# Patient Record
Sex: MALE | Race: WHITE | NOT HISPANIC OR LATINO
[De-identification: names, ages, dates, MRNs, and addresses within clinical notes are randomized per-mention and may not be internally consistent; named-entity substitution may affect disease eponyms.]

---

## 2017-02-07 ENCOUNTER — APPOINTMENT (OUTPATIENT)
Dept: UROLOGY | Facility: CLINIC | Age: 62
End: 2017-02-07

## 2017-02-07 VITALS — RESPIRATION RATE: 16 BRPM | SYSTOLIC BLOOD PRESSURE: 144 MMHG | HEART RATE: 67 BPM | DIASTOLIC BLOOD PRESSURE: 79 MMHG

## 2017-02-08 ENCOUNTER — RECORD ABSTRACTING (OUTPATIENT)
Age: 62
End: 2017-02-08

## 2017-02-08 RX ORDER — ATORVASTATIN CALCIUM 80 MG/1
TABLET, FILM COATED ORAL
Refills: 0 | Status: ACTIVE | COMMUNITY

## 2017-02-08 RX ORDER — CLOPIDOGREL BISULFATE 300 MG/1
TABLET, FILM COATED ORAL
Refills: 0 | Status: ACTIVE | COMMUNITY

## 2017-02-12 LAB
ALBUMIN SERPL ELPH-MCNC: 4.4 G/DL
ALP BLD-CCNC: 99 U/L
ALT SERPL-CCNC: 16 U/L
ANION GAP SERPL CALC-SCNC: 16 MMOL/L
APPEARANCE: CLEAR
AST SERPL-CCNC: 19 U/L
BACTERIA UR CULT: NORMAL
BACTERIA: NEGATIVE
BASOPHILS # BLD AUTO: 0.05 K/UL
BASOPHILS NFR BLD AUTO: 0.6 %
BILIRUB SERPL-MCNC: 1 MG/DL
BILIRUBIN URINE: NEGATIVE
BLOOD URINE: NEGATIVE
BUN SERPL-MCNC: 14 MG/DL
CALCIUM SERPL-MCNC: 9 MG/DL
CHLORIDE SERPL-SCNC: 100 MMOL/L
CO2 SERPL-SCNC: 25 MMOL/L
COLOR: YELLOW
CORE LAB FLUID CYTOLOGY: NORMAL
CREAT SERPL-MCNC: 0.78 MG/DL
EOSINOPHIL # BLD AUTO: 0.21 K/UL
EOSINOPHIL NFR BLD AUTO: 2.7 %
GLUCOSE QUALITATIVE U: NORMAL MG/DL
GLUCOSE SERPL-MCNC: 105 MG/DL
HCT VFR BLD CALC: 45.9 %
HGB BLD-MCNC: 15.4 G/DL
HYALINE CASTS: 0 /LPF
IMM GRANULOCYTES NFR BLD AUTO: 0.5 %
KETONES URINE: NEGATIVE
LEUKOCYTE ESTERASE URINE: NEGATIVE
LYMPHOCYTES # BLD AUTO: 2.17 K/UL
LYMPHOCYTES NFR BLD AUTO: 27.5 %
MAN DIFF?: NORMAL
MCHC RBC-ENTMCNC: 28.8 PG
MCHC RBC-ENTMCNC: 33.6 GM/DL
MCV RBC AUTO: 86 FL
MICROSCOPIC-UA: NORMAL
MONOCYTES # BLD AUTO: 0.96 K/UL
MONOCYTES NFR BLD AUTO: 12.2 %
NEUTROPHILS # BLD AUTO: 4.47 K/UL
NEUTROPHILS NFR BLD AUTO: 56.5 %
NITRITE URINE: NEGATIVE
PH URINE: 5
PLATELET # BLD AUTO: 190 K/UL
POTASSIUM SERPL-SCNC: 4.9 MMOL/L
PROT SERPL-MCNC: 6.9 G/DL
PROTEIN URINE: NEGATIVE MG/DL
PSA SERPL-MCNC: 2.47 NG/ML
RBC # BLD: 5.34 M/UL
RBC # FLD: 14.2 %
RED BLOOD CELLS URINE: 1 /HPF
SODIUM SERPL-SCNC: 141 MMOL/L
SPECIFIC GRAVITY URINE: 1.02
SQUAMOUS EPITHELIAL CELLS: 0 /HPF
TESTOST SERPL-MCNC: 256.7 NG/DL
UROBILINOGEN URINE: NORMAL MG/DL
WBC # FLD AUTO: 7.9 K/UL
WHITE BLOOD CELLS URINE: 0 /HPF

## 2017-02-21 ENCOUNTER — OUTPATIENT (OUTPATIENT)
Dept: OUTPATIENT SERVICES | Facility: HOSPITAL | Age: 62
LOS: 1 days | End: 2017-02-21
Payer: COMMERCIAL

## 2017-02-21 ENCOUNTER — FORM ENCOUNTER (OUTPATIENT)
Age: 62
End: 2017-02-21

## 2017-02-21 ENCOUNTER — APPOINTMENT (OUTPATIENT)
Dept: UROLOGY | Facility: CLINIC | Age: 62
End: 2017-02-21

## 2017-02-21 ENCOUNTER — APPOINTMENT (OUTPATIENT)
Dept: ULTRASOUND IMAGING | Facility: IMAGING CENTER | Age: 62
End: 2017-02-21

## 2017-02-21 DIAGNOSIS — D29.32 BENIGN NEOPLASM OF LEFT EPIDIDYMIS: ICD-10-CM

## 2017-02-21 DIAGNOSIS — Z98.89 OTHER SPECIFIED POSTPROCEDURAL STATES: Chronic | ICD-10-CM

## 2017-02-21 PROCEDURE — 76870 US EXAM SCROTUM: CPT

## 2017-03-06 ENCOUNTER — APPOINTMENT (OUTPATIENT)
Dept: UROLOGY | Facility: CLINIC | Age: 62
End: 2017-03-06

## 2017-03-06 DIAGNOSIS — R10.2 PELVIC AND PERINEAL PAIN: ICD-10-CM

## 2017-03-06 DIAGNOSIS — I86.1 SCROTAL VARICES: ICD-10-CM

## 2017-03-06 DIAGNOSIS — N45.1 EPIDIDYMITIS: ICD-10-CM

## 2017-03-06 DIAGNOSIS — D29.32: ICD-10-CM

## 2017-03-10 LAB — BACTERIA UR CULT: NORMAL

## 2017-03-14 LAB
APPEARANCE: CLEAR
BACTERIA: NEGATIVE
BILIRUBIN URINE: NEGATIVE
BLOOD URINE: NEGATIVE
COLOR: YELLOW
CORE LAB FLUID CYTOLOGY: NORMAL
GLUCOSE QUALITATIVE U: NORMAL MG/DL
KETONES URINE: NEGATIVE
LEUKOCYTE ESTERASE URINE: NEGATIVE
MICROSCOPIC-UA: NORMAL
NITRITE URINE: NEGATIVE
PH URINE: 5
PROTEIN URINE: NEGATIVE MG/DL
RED BLOOD CELLS URINE: 5 /HPF
SPECIFIC GRAVITY URINE: 1.02
SQUAMOUS EPITHELIAL CELLS: 0 /HPF
UROBILINOGEN URINE: NORMAL MG/DL
WHITE BLOOD CELLS URINE: 1 /HPF

## 2017-03-15 ENCOUNTER — MESSAGE (OUTPATIENT)
Age: 62
End: 2017-03-15

## 2017-03-18 LAB
APPEARANCE: CLEAR
BACTERIA: NEGATIVE
BILIRUBIN URINE: NEGATIVE
BLOOD URINE: NEGATIVE
COLOR: YELLOW
GLUCOSE QUALITATIVE U: NORMAL MG/DL
HYALINE CASTS: 0 /LPF
KETONES URINE: ABNORMAL
LEUKOCYTE ESTERASE URINE: NEGATIVE
MICROSCOPIC-UA: NORMAL
NITRITE URINE: NEGATIVE
PH URINE: 5
PROTEIN URINE: NEGATIVE MG/DL
RED BLOOD CELLS URINE: 4 /HPF
SPECIFIC GRAVITY URINE: 1.02
SQUAMOUS EPITHELIAL CELLS: 0 /HPF
UROBILINOGEN URINE: NORMAL MG/DL
WHITE BLOOD CELLS URINE: 1 /HPF

## 2017-03-22 LAB — CORE LAB FLUID CYTOLOGY: NORMAL

## 2018-07-17 ENCOUNTER — EMERGENCY (EMERGENCY)
Facility: HOSPITAL | Age: 63
LOS: 1 days | Discharge: HOME | End: 2018-07-17
Attending: EMERGENCY MEDICINE | Admitting: EMERGENCY MEDICINE
Payer: MEDICARE

## 2018-07-17 VITALS
SYSTOLIC BLOOD PRESSURE: 116 MMHG | RESPIRATION RATE: 16 BRPM | OXYGEN SATURATION: 95 % | DIASTOLIC BLOOD PRESSURE: 68 MMHG | HEART RATE: 67 BPM

## 2018-07-17 VITALS
DIASTOLIC BLOOD PRESSURE: 70 MMHG | TEMPERATURE: 98 F | WEIGHT: 147.93 LBS | RESPIRATION RATE: 19 BRPM | OXYGEN SATURATION: 99 % | HEART RATE: 72 BPM | HEIGHT: 67 IN | SYSTOLIC BLOOD PRESSURE: 136 MMHG

## 2018-07-17 DIAGNOSIS — Z98.89 OTHER SPECIFIED POSTPROCEDURAL STATES: Chronic | ICD-10-CM

## 2018-07-17 LAB
ALBUMIN SERPL ELPH-MCNC: 5 G/DL — SIGNIFICANT CHANGE UP (ref 3.3–5)
ALP SERPL-CCNC: 95 U/L — SIGNIFICANT CHANGE UP (ref 40–120)
ALT FLD-CCNC: 24 U/L — SIGNIFICANT CHANGE UP (ref 10–45)
ANION GAP SERPL CALC-SCNC: 19 MMOL/L — HIGH (ref 5–17)
APPEARANCE UR: CLEAR — SIGNIFICANT CHANGE UP
APTT BLD: 28.4 SEC — SIGNIFICANT CHANGE UP (ref 27.5–37.4)
AST SERPL-CCNC: 22 U/L — SIGNIFICANT CHANGE UP (ref 10–40)
BASOPHILS # BLD AUTO: 0 K/UL — SIGNIFICANT CHANGE UP (ref 0–0.2)
BASOPHILS NFR BLD AUTO: 0.2 % — SIGNIFICANT CHANGE UP (ref 0–2)
BILIRUB SERPL-MCNC: 1.1 MG/DL — SIGNIFICANT CHANGE UP (ref 0.2–1.2)
BILIRUB UR-MCNC: NEGATIVE — SIGNIFICANT CHANGE UP
BUN SERPL-MCNC: 14 MG/DL — SIGNIFICANT CHANGE UP (ref 7–23)
CALCIUM SERPL-MCNC: 9.2 MG/DL — SIGNIFICANT CHANGE UP (ref 8.4–10.5)
CHLORIDE SERPL-SCNC: 104 MMOL/L — SIGNIFICANT CHANGE UP (ref 96–108)
CO2 SERPL-SCNC: 23 MMOL/L — SIGNIFICANT CHANGE UP (ref 22–31)
COLOR SPEC: SIGNIFICANT CHANGE UP
COMMENT - URINE: SIGNIFICANT CHANGE UP
CREAT SERPL-MCNC: 0.83 MG/DL — SIGNIFICANT CHANGE UP (ref 0.5–1.3)
DIFF PNL FLD: NEGATIVE — SIGNIFICANT CHANGE UP
EOSINOPHIL # BLD AUTO: 0 K/UL — SIGNIFICANT CHANGE UP (ref 0–0.5)
EOSINOPHIL NFR BLD AUTO: 0.3 % — SIGNIFICANT CHANGE UP (ref 0–6)
GAS PNL BLDV: SIGNIFICANT CHANGE UP
GLUCOSE SERPL-MCNC: 143 MG/DL — HIGH (ref 70–99)
GLUCOSE UR QL: NEGATIVE — SIGNIFICANT CHANGE UP
HCT VFR BLD CALC: 45.7 % — SIGNIFICANT CHANGE UP (ref 39–50)
HGB BLD-MCNC: 16.5 G/DL — SIGNIFICANT CHANGE UP (ref 13–17)
INR BLD: 1.04 RATIO — SIGNIFICANT CHANGE UP (ref 0.88–1.16)
KETONES UR-MCNC: ABNORMAL
LACTATE BLDV-MCNC: 3.1 MMOL/L — HIGH (ref 0.7–2)
LACTATE BLDV-MCNC: 3.9 MMOL/L — HIGH (ref 0.7–2)
LEUKOCYTE ESTERASE UR-ACNC: NEGATIVE — SIGNIFICANT CHANGE UP
LIDOCAIN IGE QN: 23 U/L — SIGNIFICANT CHANGE UP (ref 7–60)
LYMPHOCYTES # BLD AUTO: 1.3 K/UL — SIGNIFICANT CHANGE UP (ref 1–3.3)
LYMPHOCYTES # BLD AUTO: 12.4 % — LOW (ref 13–44)
MCHC RBC-ENTMCNC: 31.5 PG — SIGNIFICANT CHANGE UP (ref 27–34)
MCHC RBC-ENTMCNC: 36.1 GM/DL — HIGH (ref 32–36)
MCV RBC AUTO: 87.5 FL — SIGNIFICANT CHANGE UP (ref 80–100)
MONOCYTES # BLD AUTO: 0.6 K/UL — SIGNIFICANT CHANGE UP (ref 0–0.9)
MONOCYTES NFR BLD AUTO: 5.6 % — SIGNIFICANT CHANGE UP (ref 2–14)
NEUTROPHILS # BLD AUTO: 8.3 K/UL — HIGH (ref 1.8–7.4)
NEUTROPHILS NFR BLD AUTO: 81.6 % — HIGH (ref 43–77)
NITRITE UR-MCNC: NEGATIVE — SIGNIFICANT CHANGE UP
PH UR: 7.5 — SIGNIFICANT CHANGE UP (ref 5–8)
PLATELET # BLD AUTO: 183 K/UL — SIGNIFICANT CHANGE UP (ref 150–400)
POTASSIUM SERPL-MCNC: 4.5 MMOL/L — SIGNIFICANT CHANGE UP (ref 3.5–5.3)
POTASSIUM SERPL-SCNC: 4.5 MMOL/L — SIGNIFICANT CHANGE UP (ref 3.5–5.3)
PROT SERPL-MCNC: 7.6 G/DL — SIGNIFICANT CHANGE UP (ref 6–8.3)
PROT UR-MCNC: SIGNIFICANT CHANGE UP
PROTHROM AB SERPL-ACNC: 11.2 SEC — SIGNIFICANT CHANGE UP (ref 9.8–12.7)
RBC # BLD: 5.22 M/UL — SIGNIFICANT CHANGE UP (ref 4.2–5.8)
RBC # FLD: 12.8 % — SIGNIFICANT CHANGE UP (ref 10.3–14.5)
RBC CASTS # UR COMP ASSIST: SIGNIFICANT CHANGE UP /HPF (ref 0–2)
SODIUM SERPL-SCNC: 146 MMOL/L — HIGH (ref 135–145)
SP GR SPEC: >1.03 — HIGH (ref 1.01–1.02)
UROBILINOGEN FLD QL: NEGATIVE — SIGNIFICANT CHANGE UP
WBC # BLD: 10.2 K/UL — SIGNIFICANT CHANGE UP (ref 3.8–10.5)
WBC # FLD AUTO: 10.2 K/UL — SIGNIFICANT CHANGE UP (ref 3.8–10.5)
WBC UR QL: SIGNIFICANT CHANGE UP /HPF (ref 0–5)

## 2018-07-17 PROCEDURE — 81001 URINALYSIS AUTO W/SCOPE: CPT

## 2018-07-17 PROCEDURE — 84295 ASSAY OF SERUM SODIUM: CPT

## 2018-07-17 PROCEDURE — 85027 COMPLETE CBC AUTOMATED: CPT

## 2018-07-17 PROCEDURE — 96374 THER/PROPH/DIAG INJ IV PUSH: CPT | Mod: XU

## 2018-07-17 PROCEDURE — 84132 ASSAY OF SERUM POTASSIUM: CPT

## 2018-07-17 PROCEDURE — 85610 PROTHROMBIN TIME: CPT

## 2018-07-17 PROCEDURE — 70450 CT HEAD/BRAIN W/O DYE: CPT

## 2018-07-17 PROCEDURE — 82435 ASSAY OF BLOOD CHLORIDE: CPT

## 2018-07-17 PROCEDURE — 82803 BLOOD GASES ANY COMBINATION: CPT

## 2018-07-17 PROCEDURE — 82947 ASSAY GLUCOSE BLOOD QUANT: CPT

## 2018-07-17 PROCEDURE — 74177 CT ABD & PELVIS W/CONTRAST: CPT

## 2018-07-17 PROCEDURE — 93010 ELECTROCARDIOGRAM REPORT: CPT

## 2018-07-17 PROCEDURE — 70450 CT HEAD/BRAIN W/O DYE: CPT | Mod: 26

## 2018-07-17 PROCEDURE — 85730 THROMBOPLASTIN TIME PARTIAL: CPT

## 2018-07-17 PROCEDURE — 93005 ELECTROCARDIOGRAM TRACING: CPT

## 2018-07-17 PROCEDURE — 80053 COMPREHEN METABOLIC PANEL: CPT

## 2018-07-17 PROCEDURE — 82330 ASSAY OF CALCIUM: CPT

## 2018-07-17 PROCEDURE — 83690 ASSAY OF LIPASE: CPT

## 2018-07-17 PROCEDURE — 99284 EMERGENCY DEPT VISIT MOD MDM: CPT | Mod: 25

## 2018-07-17 PROCEDURE — 85014 HEMATOCRIT: CPT

## 2018-07-17 PROCEDURE — 83605 ASSAY OF LACTIC ACID: CPT

## 2018-07-17 PROCEDURE — 74177 CT ABD & PELVIS W/CONTRAST: CPT | Mod: 26

## 2018-07-17 RX ORDER — DIAZEPAM 5 MG
2 TABLET ORAL ONCE
Qty: 0 | Refills: 0 | Status: DISCONTINUED | OUTPATIENT
Start: 2018-07-17 | End: 2018-07-17

## 2018-07-17 RX ORDER — SODIUM CHLORIDE 9 MG/ML
1000 INJECTION INTRAMUSCULAR; INTRAVENOUS; SUBCUTANEOUS ONCE
Qty: 0 | Refills: 0 | Status: COMPLETED | OUTPATIENT
Start: 2018-07-17 | End: 2018-07-17

## 2018-07-17 RX ORDER — MECLIZINE HCL 12.5 MG
2 TABLET ORAL
Qty: 16 | Refills: 0 | OUTPATIENT
Start: 2018-07-17 | End: 2018-07-20

## 2018-07-17 RX ORDER — SODIUM CHLORIDE 9 MG/ML
1000 INJECTION, SOLUTION INTRAVENOUS ONCE
Qty: 0 | Refills: 0 | Status: COMPLETED | OUTPATIENT
Start: 2018-07-17 | End: 2018-07-17

## 2018-07-17 RX ORDER — MECLIZINE HCL 12.5 MG
25 TABLET ORAL ONCE
Qty: 0 | Refills: 0 | Status: COMPLETED | OUTPATIENT
Start: 2018-07-17 | End: 2018-07-17

## 2018-07-17 RX ORDER — DIPHENHYDRAMINE HCL 50 MG
50 CAPSULE ORAL ONCE
Qty: 0 | Refills: 0 | Status: DISCONTINUED | OUTPATIENT
Start: 2018-07-17 | End: 2018-07-17

## 2018-07-17 RX ORDER — MECLIZINE HCL 12.5 MG
50 TABLET ORAL ONCE
Qty: 0 | Refills: 0 | Status: DISCONTINUED | OUTPATIENT
Start: 2018-07-17 | End: 2018-07-17

## 2018-07-17 RX ORDER — ONDANSETRON 8 MG/1
4 TABLET, FILM COATED ORAL ONCE
Qty: 0 | Refills: 0 | Status: COMPLETED | OUTPATIENT
Start: 2018-07-17 | End: 2018-07-17

## 2018-07-17 RX ADMIN — SODIUM CHLORIDE 1000 MILLILITER(S): 9 INJECTION INTRAMUSCULAR; INTRAVENOUS; SUBCUTANEOUS at 09:40

## 2018-07-17 RX ADMIN — ONDANSETRON 4 MILLIGRAM(S): 8 TABLET, FILM COATED ORAL at 09:40

## 2018-07-17 RX ADMIN — Medication 2 MILLIGRAM(S): at 12:34

## 2018-07-17 RX ADMIN — SODIUM CHLORIDE 1000 MILLILITER(S): 9 INJECTION, SOLUTION INTRAVENOUS at 12:39

## 2018-07-17 RX ADMIN — SODIUM CHLORIDE 2000 MILLILITER(S): 9 INJECTION, SOLUTION INTRAVENOUS at 11:47

## 2018-07-17 RX ADMIN — Medication 25 MILLIGRAM(S): at 12:34

## 2018-07-17 RX ADMIN — Medication 25 MILLIGRAM(S): at 09:39

## 2018-07-17 NOTE — ED ADULT NURSE NOTE - OBJECTIVE STATEMENT
Patient presents to Ed with c/o abd pain. Per patients spouse patient has been experiencing room spinning  dizziness x 1 week and has vomited 5 times since last night. Patient A&Ox3,hx of stroke per spouse, speaks Upper sorbian,   denies chest pain or sob, +nausea and vomiting no diarrhea fever or chills. Patient reports dizziness when he stands   up to amb no headache, reports llq pain when vomiting. LAC 20g iv lock placed, labs drawn.

## 2018-07-17 NOTE — ED SUB INTERN NOTE - OBJECTIVE STATEMENT FT
MA is a 61 y/o M with a PMHx of CVA (2015) with moderate residual aphasia (history obtained from wife), complaining of acute dizziness x1 week. Dizziness was intermittent in nature, progressively worsening, worsens with movement, and positional in nature. Last night, the dizziness was constant, and he endorsed 5+ episodes of NBNB vomiting with abdominal pain prompting him to come into the ED for further evaluation.    He otherwise denies fevers, shortness of breath, cough, chest pain, or diarrhea.

## 2018-07-17 NOTE — ED PROVIDER NOTE - PSH
H/O arthroscopy of shoulder  right 4/2014  H/O bilateral inguinal hernia repair  4/2004  History of appendectomy  1965  History of tonsillectomy  1963  S/P excision of lipoma  multiple (chest, back:[id number]

## 2018-07-17 NOTE — ED PROVIDER NOTE - CARE PLAN
Principal Discharge DX:	Vertigo Principal Discharge DX:	Vertigo  Assessment and plan of treatment:	1) Follow up with your doctor in 1 week. Call for an appointment.   2) Return to the ER immediately for new or worsening symptoms including chest pain, severe dizziness or other issues.   3) Take Meclizine 50mg twice a day as needed for dizziness.   4) Drink plenty of fluids.

## 2018-07-17 NOTE — ED ADULT TRIAGE NOTE - CHIEF COMPLAINT QUOTE
abdominal pain, vomiting, dizziness since last night. Pt is non verbal due to past stroke- as per wife, pt communicates through gestures.

## 2018-07-17 NOTE — ED SUB INTERN NOTE - PHYSICAL EXAMINATION
Vital Signs:  Vital Signs Last 24 Hrs  T(C): 36.4 (17 Jul 2018 08:25), Max: 36.4 (17 Jul 2018 08:25)  T(F): 97.5 (17 Jul 2018 08:25), Max: 97.5 (17 Jul 2018 08:25)  HR: 72 (17 Jul 2018 08:25) (72 - 72)  BP: 136/70 (17 Jul 2018 08:25) (136/70 - 136/70)  RR: 19 (17 Jul 2018 08:25) (19 - 19)  SpO2: 99% (17 Jul 2018 08:25) (99% - 99%) Vital Signs:  Vital Signs Last 24 Hrs  T(C): 36.4 (17 Jul 2018 08:25), Max: 36.4 (17 Jul 2018 08:25)  T(F): 97.5 (17 Jul 2018 08:25), Max: 97.5 (17 Jul 2018 08:25)  HR: 72 (17 Jul 2018 08:25) (72 - 72)  BP: 136/70 (17 Jul 2018 08:25) (136/70 - 136/70)  RR: 19 (17 Jul 2018 08:25) (19 - 19)  SpO2: 99% (17 Jul 2018 08:25) (99% - 99%)    General: Male laying in bed in mild discomfort, hesitant to move due to dizziness  HEENT: NCAT, PERRLA, no conjunctival pallor, moist mucous membranes, no oropharyngeal exudates or erythema  Neck: Supple, no lymphadenopathy  Pulmonary: Clear to auscultation bilaterally, no wheezing, rales, or ronchi  Cardiovascular: Regular rate and rhythm, normal S1/S2, no murmurs, rubs, or gallops  Abdominal Exam: Soft, tender to palpation in LLQ and RLQ, no organomegaly  Extremities: No edema, pulses 2+ bilaterally in the upper and lower extremities, capillary refill < 2 sec.  Neuro: Horizontal nystagmus to the left. No vertical or rotary nystagmus. CNs, motor, sensory, coordination, and reflexes grossly intact in all extremities  Skin: Warm, dry, no visible jaundice, no rashes

## 2018-07-17 NOTE — ED PROVIDER NOTE - ATTENDING CONTRIBUTION TO CARE
68y/o M with h/o CVA (L MCA, residual aphasia), presenting with intermittent room-spinning sensation, worse with changes in position and looking left.  No significant HA. +nausea/vomiting.  Abdominal pain, lower, cramping, and a/w vomiting.  No new weakness/numbness or difficulty walking.  No fever/chills. No diarrhea.  Last BM yesterday, normal per wife.    On exam, VSS, afebrile, appears uncomfortable but cooperative with exam; says simple words/yes/no, and appears to answer questions correctly (baseline as per wife at bedside). Nrl cardiac s1s2 rrr no mgr.  Lungs CTABL.  Abdomen with mild left lower abdominal tenderness on my exam, without significant distension, rebound or guarding.  Nrl bs in all 4 quadrants.  No rash.  ENT: mildly dry mucosa, but no lesions.  No cervical LNA.  Neuro: aphasic (baseline). No significant cranial nerve deficits elicited; however, leftward extinguishable nystagmus elicited on EOMI, and Nutley's Hallpike +; HINTS c/w peripheral vertigo.      Given patient's medical history CT head obtained to eval for possible bleeding, no acute changes noted.  CT A/P obtained given lower abdominal tenderness, no acute pathology identified.  Labs obtained, notable for elevated lactate 5; which downtrended with iv fluid/hydration.  Patient's PE is c/w peripheral vertigo, and symptoms improved significantly with meclizine while in the ED, began tolerating oral liquids and appeared much more comfortable.   Likely abdominal pain and elevated lactate related to vomiting and dehydration.  Dizziness/room-spinning likely peripheral vertigo; will be stable for dc and recommend continue evaluation/management as outpatient.  Will dc with Rx for meclizine.

## 2018-07-17 NOTE — ED SUB INTERN NOTE - PROGRESS NOTE DETAILS
Reassessed at bedside, continues to endorse same dizziness, nausea, and abdominal pain without relief. CBC reviewed, not concerning at this time. Coags, CMP not of concern. vBG revealed lactate of 5.7, likely due to hypovolemia secondary to vomiting. CTH and CTAP negative. Will continue IVF hydration (2L total) and repeat. Reassessed, continues to endorse dizziness/nausea. Exam unchanged. Will treat with additional IV meclizine 25mg and valium 2mg. UA pending. Will reassess.

## 2018-07-17 NOTE — ED PROVIDER NOTE - MEDICAL DECISION MAKING DETAILS
62yoM pw vertigo and abd pain, likely benign, however given history and Tenderness on abd, will send labs, ct head and abd, meds, fluids and reass.

## 2018-07-17 NOTE — ED PROVIDER NOTE - PROGRESS NOTE DETAILS
Jacobo DAVID - patient nausea improved. lacate elevated. will give 2 L total fluid and repeat. likely in the setting of vomiting. no other signs of sepsis. Jacobo DAVID - PT seen and reassessed.  Patient symptomatically improved.   AAOX3, NAD, VSS.  Discussed test results w/ patient, given copy of results. Patient verbalized understanding of hospital course and outpatient plans, has decisional making capacity.  Will follow-up with Primary care doctor in the next 5-7 days; patient will call for an appointment. Will return to the ED if there is any worsening of symptoms.  Patient able to ambulate w/o difficulty, is tolerating PO intake

## 2018-07-17 NOTE — ED SUB INTERN NOTE - PSH
H/O arthroscopy of shoulder  right 4/2014  H/O bilateral inguinal hernia repair  4/2004  History of appendectomy  1965  History of tonsillectomy  1963  S/P excision of lipoma  multiple (chest, back:[id number]

## 2018-07-17 NOTE — ED PROVIDER NOTE - PLAN OF CARE
1) Follow up with your doctor in 1 week. Call for an appointment.   2) Return to the ER immediately for new or worsening symptoms including chest pain, severe dizziness or other issues.   3) Take Meclizine 50mg twice a day as needed for dizziness.   4) Drink plenty of fluids.

## 2020-06-16 ENCOUNTER — LABORATORY RESULT (OUTPATIENT)
Age: 65
End: 2020-06-16

## 2020-06-16 ENCOUNTER — APPOINTMENT (OUTPATIENT)
Dept: UROLOGY | Facility: CLINIC | Age: 65
End: 2020-06-16
Payer: MEDICARE

## 2020-06-16 VITALS — TEMPERATURE: 97.5 F

## 2020-06-16 VITALS — DIASTOLIC BLOOD PRESSURE: 66 MMHG | SYSTOLIC BLOOD PRESSURE: 133 MMHG | HEART RATE: 69 BPM

## 2020-06-16 DIAGNOSIS — L72.9 FOLLICULAR CYST OF THE SKIN AND SUBCUTANEOUS TISSUE, UNSPECIFIED: ICD-10-CM

## 2020-06-16 PROCEDURE — 99214 OFFICE O/P EST MOD 30 MIN: CPT

## 2020-06-16 RX ORDER — LEVOFLOXACIN 500 MG/1
500 TABLET, FILM COATED ORAL DAILY
Qty: 14 | Refills: 0 | Status: COMPLETED | COMMUNITY
Start: 2017-02-07 | End: 2020-06-16

## 2020-06-16 NOTE — PHYSICAL EXAM
[General Appearance - Well Developed] : well developed [General Appearance - Well Nourished] : well nourished [Normal Appearance] : normal appearance [Well Groomed] : well groomed [Abdomen Tenderness] : non-tender [General Appearance - In No Acute Distress] : no acute distress [Abdomen Soft] : soft [Costovertebral Angle Tenderness] : no ~M costovertebral angle tenderness [Skin Color & Pigmentation] : normal skin color and pigmentation [Heart Rate And Rhythm] : Heart rate and rhythm were normal [] : no respiratory distress [Exaggerated Use Of Accessory Muscles For Inspiration] : no accessory muscle use [Mood] : the mood was normal [Oriented To Time, Place, And Person] : oriented to person, place, and time [Affect] : the affect was normal [Not Anxious] : not anxious [Normal Station and Gait] : the gait and station were normal for the patient's age [Cervical Lymph Nodes Enlarged Posterior Bilaterally] : posterior cervical [No Focal Deficits] : no focal deficits [Supraclavicular Lymph Nodes Enlarged Bilaterally] : supraclavicular [Cervical Lymph Nodes Enlarged Anterior Bilaterally] : anterior cervical [Urethral Meatus] : meatus normal [Penis Abnormality] : normal circumcised penis [Urinary Bladder Findings] : the bladder was normal on palpation [Testes Mass (___cm)] : there were no testicular masses [Scrotum] : the scrotum was normal [No Prostate Nodules] : no prostate nodules [Prostate Size ___ gm] : prostate size [unfilled] gm [FreeTextEntry1] : No spine tenderness.

## 2020-06-16 NOTE — ADDENDUM
[FreeTextEntry1] : This note was authored by Alanna Burrell working as scribe for Dr. Live Zamora. I, Dr. Live Zamora, have reviewed the content of this note and confirm it is true and accurate. I personally performed the history and physical examination and made all the decisions.\par 06/16/2020

## 2020-06-16 NOTE — ASSESSMENT
[FreeTextEntry1] : Scrotal US 02/22/17 found a 4 mm right epididymal cyst and a varicocele on left side. The patient feels better since completing his course of levofloxacin. However, he is still bothered by the cysts on his scrotum. I poked a small hole in one of the sebaceous cysts and expressed the contents completely. I explained the patient that if the remaining cysts continue to bother him, he can return to have the rest of the cysts expressed. This cyst was more difficult than expected due to the cyst being more dry than expected. At that time I explained that I would numb the patient with local anesthetic and cut the cysts open removing the contents. I again reiterated that the cysts are benign and only require action if they continue to bother him. If everything remains normal the patient will return in 3 months for follow up. The patient produced a urine sample which will be sent for urinalysis, urine cytology, and urine culture. This note was authored by Gabe Bustos working as scribe for Dr. Live Zamora.\par I, Dr. Live Zamora, have reviewed the content of this note and confirm it is true and accurate. I personally performed the history and physical examination and made all the decisions. 03/06/17 5:00 PM\par \par 06/16/2020: Patient presents today for a follow up. Pt had 2015 Prostate Bx with benign results. PSA was 2.47 on 02/07/17 and testosterone was 256.7. Pt is voiding well. No ED. He complains of some pain on his testicle from a cyst. Pt is on Atorvastatin and Plavix. No Hx of smoking. \par \par Digital rectal exam found pale patch of skin below surface 3mm across. another path 4mm across that's deeper. Non tender, non inflamed. Not circumcised. No anal adenopathy. Firmness in left base. 7 TUR.\par \par Pt will provide a urine sample today for culture, cytology and analysis. Bloodwork today will include ALK SUSAN, BMP, PSA and testosterone. \par \par Scheduled pt for an MRI of the prostate. RTO 1 week afterwards.

## 2020-06-16 NOTE — HISTORY OF PRESENT ILLNESS
[FreeTextEntry1] : Mr Brenner is a 61 year old man presenting for follow up of microscopic hematuria, varicocele, perineal pain, and epididymitis. The patient had a left inguinal hernia repair in the past. Since that time he has had left inguinal and left testicular spermatic cord pain. Scrotal US 09/02/15 visualized a 3.3 mm right epididymal cyst. Multiple small left varicoceles, largest measured 2.5 mm. Both testicles are normal in size and echogenicity. Normal vascularity in testicles and epididymides. No intratesticular masses present bilaterally. Normal Resistive Index bilaterally. We discussed surgical intervention about a year ago, but the patient opted for further observation. The patient suffered a stroke this past December. It eliminated most of his ability to speak and the right side of his face is slightly weak. Otherwise motor function is intact. On physical exam 02/07/17 the patient had a few small sebaceous cysts of scrotum bilaterally. Mild left epididymal tenderness. There was no intra testicular masses bilaterally. Testicular positions normal bilaterally. Normal spermatic cords. I started the patient on a two week course of Levofloxacin 500 mg once daily. PSA taken 02/07/17 was 2.47. On physical exam 02/21/17 there were 5 sebaceous cysts remaining on the scrotum. Testicles bilaterally descended. Epididymal cyst on left. Left scrotum tender. No inguinal or femoral hernia. Scrotal US 02/22/17 found a 4 mm right epididymal cyst and a varicocele on left side. The patient feels better since completing his course of levofloxacin. However, he is still bothered by the cysts on his scrotum. \par \par 06/16/2020: Patient presents today for a follow up. Pt had 2015 Prostate Bx with benign results. PSA was 2.47 on 02/07/17 and testosterone was 256.7. Pt is voiding well. No ED. He complains of some pain on his testicle from a cyst. Pt is on Atorvastatin and Plavix. No Hx of smoking.

## 2020-06-16 NOTE — REVIEW OF SYSTEMS
[Eyesight Problems] : eyesight problems [Constipation] : constipation [Lower Back Pain] : lower back pain [Feeling Poorly] : not feeling poorly [Cough] : no cough [Chest Pain] : no chest pain

## 2020-06-21 LAB
ALP BLD-CCNC: 100 U/L
ANION GAP SERPL CALC-SCNC: 12 MMOL/L
APPEARANCE: ABNORMAL
BILIRUBIN URINE: NEGATIVE
BLOOD URINE: NEGATIVE
BUN SERPL-MCNC: 16 MG/DL
CALCIUM SERPL-MCNC: 9.2 MG/DL
CHLORIDE SERPL-SCNC: 104 MMOL/L
CO2 SERPL-SCNC: 26 MMOL/L
COLOR: YELLOW
CREAT SERPL-MCNC: 0.88 MG/DL
GLUCOSE QUALITATIVE U: NEGATIVE
GLUCOSE SERPL-MCNC: 115 MG/DL
KETONES URINE: NEGATIVE
LEUKOCYTE ESTERASE URINE: NEGATIVE
NITRITE URINE: NEGATIVE
PH URINE: 5.5
POTASSIUM SERPL-SCNC: 4.1 MMOL/L
PROTEIN URINE: NORMAL
PSA SERPL-MCNC: 2.81 NG/ML
SODIUM SERPL-SCNC: 142 MMOL/L
SPECIFIC GRAVITY URINE: 1.03
TESTOST SERPL-MCNC: 377 NG/DL
URINE CYTOLOGY: NORMAL
UROBILINOGEN URINE: NORMAL

## 2020-07-25 ENCOUNTER — APPOINTMENT (OUTPATIENT)
Dept: MRI IMAGING | Facility: IMAGING CENTER | Age: 65
End: 2020-07-25

## 2021-04-02 NOTE — ED SUB INTERN NOTE - MEDICAL DECISION MAKING DETAILS
[Follow-up Visit ___] : a follow-up visit  for [unfilled] MA is a 61 y/o PMHx of CVA complaining of acute dizziness x1 week, positional in nature. Exam: Horizontal nystagmus and abdominal tenderness. Assessment: Likely secondary to BPPV and abdominal pain secondary to multiple episodes of emesis. Cannot rule out intra-abdominal pathology or recurrent CVA. Plan for IVF NS 1L bolus, meclizine, zofran, CTH, CTAP. Will reassess and continue to monitor clinically.

## 2022-03-28 ENCOUNTER — APPOINTMENT (OUTPATIENT)
Dept: UROLOGY | Facility: CLINIC | Age: 67
End: 2022-03-28
Payer: MEDICARE

## 2022-03-28 DIAGNOSIS — N40.1 BENIGN PROSTATIC HYPERPLASIA WITH LOWER URINARY TRACT SYMPMS: ICD-10-CM

## 2022-03-28 DIAGNOSIS — L29.1 PRURITUS SCROTI: ICD-10-CM

## 2022-03-28 DIAGNOSIS — R31.29 OTHER MICROSCOPIC HEMATURIA: ICD-10-CM

## 2022-03-28 DIAGNOSIS — N40.2 NODULAR PROSTATE W/OUT LOWER URINARY TRACT SYMPTOMS: ICD-10-CM

## 2022-03-28 DIAGNOSIS — R93.5 ABNORMAL FINDINGS ON DIAGNOSTIC IMAGING OF OTHER ABDOMINAL REGIONS, INCLUDING RETROPERITONEUM: ICD-10-CM

## 2022-03-28 DIAGNOSIS — N13.8 BENIGN PROSTATIC HYPERPLASIA WITH LOWER URINARY TRACT SYMPMS: ICD-10-CM

## 2022-03-28 PROCEDURE — 99215 OFFICE O/P EST HI 40 MIN: CPT

## 2022-03-28 RX ORDER — CLOTRIMAZOLE AND BETAMETHASONE DIPROPIONATE 10; .5 MG/G; MG/G
1-0.05 CREAM TOPICAL
Qty: 1 | Refills: 1 | Status: ACTIVE | COMMUNITY
Start: 2022-03-28 | End: 1900-01-01

## 2022-03-28 NOTE — ASSESSMENT
[FreeTextEntry1] : Scrotal US 02/22/17 found a 4 mm right epididymal cyst and a varicocele on left side. The patient feels better since completing his course of levofloxacin. However, he is still bothered by the cysts on his scrotum. I poked a small hole in one of the sebaceous cysts and expressed the contents completely. I explained the patient that if the remaining cysts continue to bother him, he can return to have the rest of the cysts expressed. This cyst was more difficult than expected due to the cyst being more dry than expected. At that time I explained that I would numb the patient with local anesthetic and cut the cysts open removing the contents. I again reiterated that the cysts are benign and only require action if they continue to bother him. If everything remains normal the patient will return in 3 months for follow up. The patient produced a urine sample which will be sent for urinalysis, urine cytology, and urine culture. This note was authored by Gabe Bustos working as scribe for Dr. Live Zamora.\par I, Dr. Live Zamora, have reviewed the content of this note and confirm it is true and accurate. I personally performed the history and physical examination and made all the decisions. 03/06/17 5:00 PM\par \par 06/16/2020: Patient presents today for a follow up. Pt had 2015 Prostate Bx with benign results. PSA was 2.47 on 02/07/17 and testosterone was 256.7. Pt is voiding well. No ED. He complains of some pain on his testicle from a cyst. Pt is on Atorvastatin and Plavix. No Hx of smoking. \par \par 03/28/2022: Patient presents today for follow up. Accompanied by wife who is acting as . Patient provided report of 2/28/22 MRI of bony pelvis w/o contrast for pain in left lower back ordered by Dr. Dozier which showed prostate gland moderately enlarged, nodularity within central gland, right posterior aspect of central gland is hypointense, and presents today to have abnormality of prostate evaluated. Patient's back pain is position-dependent and worsened with movement, but is now resolved. Erections are good. No hx of kidney stones. No FHx of prostate cancer. Also complains of itching of testicles. Patient urinates about 4-5x during the day. Nocturia x1-2. No urinary urgency, straining, gross hematuria, or dysuria. Not taking atorvastatin currently although wife states his cholesterol is high. \par \par Digital rectal exam found no suspicious rectal masses. No rectal mucosal lesions. Anal tone is normal. The prostate is non tender, with normal texture, discrete borders. Firm area of left base, 1cm, deep, a little bit irregular. It is a X gram transurethral resection size. No gross blood on examining fingers. \par \par Reviewed 2/28/22 MRI of bony pelvis (no contrast) report. \par \par Recommend prostate MRI to evaluate abnormality of  right prostate seen on 2/28/22 MRI of bony pelvis as well as prostate nodule felt on left side of prostate. \par \par Recommend patient to return to  atorvastatin and discuss with PCP. He should discuss this with Dr. Dozier. \par \par We applied a band aid to a small abrasion of his left thumb. \par \par I will prescribe Clotrimazole-Betamethasone cream to apply to affected scrotal  area twice daily, and should continue for two additional days after symptoms resolve. \par \par The patient produced a urine sample which will be sent for urinalysis, urine cytology, and urine culture. \par Blood work today included BMP, alkaline phosphatase, PSA, and testosterone. \par \par RTO 1 week after prostate MRI to review results. \par \par Preparation, in-person office visit, and coordination of care took: 45 minutes

## 2022-03-28 NOTE — ADDENDUM
[FreeTextEntry1] : I, Joanne Ballesterosin, acted solely as a scribe for Dr. Live Zamora on this date 03/28/2022.\par \par All medical record entries made by the Scribe were at my, Dr. Live Zamora, direction and personally dictated by me on 03/28/2022. I have reviewed the chart and agree that the record accurately reflects my personal performance of the history, physical exam, assessment and plan.  I have also personally directed, reviewed and agreed with the chart.

## 2022-03-28 NOTE — HISTORY OF PRESENT ILLNESS
[FreeTextEntry1] : Mr Brenner is a 66 year old man presenting for follow up of microscopic hematuria, varicocele, perineal pain, and epididymitis. The patient had a left inguinal hernia repair in the past. Since that time he has had left inguinal and left testicular spermatic cord pain. Scrotal US 09/02/15 visualized a 3.3 mm right epididymal cyst. Multiple small left varicoceles, largest measured 2.5 mm. Both testicles are normal in size and echogenicity. Normal vascularity in testicles and epididymides. No intratesticular masses present bilaterally. Normal Resistive Index bilaterally. We discussed surgical intervention about a year ago, but the patient opted for further observation. The patient suffered a stroke this past December. It eliminated most of his ability to speak and the right side of his face is slightly weak. Otherwise motor function is intact. On physical exam 02/07/17 the patient had a few small sebaceous cysts of scrotum bilaterally. Mild left epididymal tenderness. There was no intra testicular masses bilaterally. Testicular positions normal bilaterally. Normal spermatic cords. I started the patient on a two week course of Levofloxacin 500 mg once daily. PSA taken 02/07/17 was 2.47. On physical exam 02/21/17 there were 5 sebaceous cysts remaining on the scrotum. Testicles bilaterally descended. Epididymal cyst on left. Left scrotum tender. No inguinal or femoral hernia. Scrotal US 02/22/17 found a 4 mm right epididymal cyst and a varicocele on left side. The patient feels better since completing his course of levofloxacin. However, he is still bothered by the cysts on his scrotum. \par \par 06/16/2020: Patient presents today for a follow up. Pt had 2015 Prostate Bx with benign results. PSA was 2.47 on 02/07/17 and testosterone was 256.7. Pt is voiding well. No ED. He complains of some pain on his testicle from a cyst. Pt is on Atorvastatin and Plavix. No Hx of smoking. \par \par 03/28/2022: Patient presents today for follow up. Accompanied by wife who is acting as . Patient provided report of 2/28/22 MRI of bony pelvis w/o contrast for pain in left lower back ordered by Dr. Dozier which showed prostate gland moderately enlarged, nodularity within central gland, right posterior aspect of central gland is hypointense, and presents today to have abnormality of prostate evaluated. Patient's back pain is position-dependent and worsened with movement, but is now resolved. Erections are good. No hx of kidney stones. No FHx of prostate cancer. Also complains of itching of scrotum.  Patient urinates about 4-5x during the day. Nocturia x1-2. No urinary urgency, straining, gross hematuria, or dysuria. Not taking atorvastatin currently although wife states his cholesterol is high.

## 2022-03-28 NOTE — PHYSICAL EXAM
[General Appearance - Well Developed] : well developed [General Appearance - Well Nourished] : well nourished [Normal Appearance] : normal appearance [Well Groomed] : well groomed [Abdomen Soft] : soft [General Appearance - In No Acute Distress] : no acute distress [Abdomen Tenderness] : non-tender [Skin Color & Pigmentation] : normal skin color and pigmentation [Edema] : no peripheral edema [] : no respiratory distress [Exaggerated Use Of Accessory Muscles For Inspiration] : no accessory muscle use [Affect] : the affect was normal [Oriented To Time, Place, And Person] : oriented to person, place, and time [Mood] : the mood was normal [Normal Station and Gait] : the gait and station were normal for the patient's age [Not Anxious] : not anxious [No Focal Deficits] : no focal deficits [FreeTextEntry1] : wears eyeglasses

## 2022-03-28 NOTE — REVIEW OF SYSTEMS
[Eyesight Problems] : eyesight problems [Lower Back Pain] : lower back pain [Constipation] : constipation [Feeling Poorly] : not feeling poorly [Chest Pain] : no chest pain [Cough] : no cough

## 2022-04-03 LAB
ALP BLD-CCNC: 87 U/L
ANION GAP SERPL CALC-SCNC: 15 MMOL/L
APPEARANCE: CLEAR
BACTERIA UR CULT: NORMAL
BACTERIA: ABNORMAL
BILIRUBIN URINE: NEGATIVE
BLOOD URINE: NEGATIVE
BUN SERPL-MCNC: 15 MG/DL
CALCIUM SERPL-MCNC: 9 MG/DL
CHLORIDE SERPL-SCNC: 104 MMOL/L
CO2 SERPL-SCNC: 22 MMOL/L
COLOR: YELLOW
CREAT SERPL-MCNC: 0.69 MG/DL
EGFR: 101 ML/MIN/1.73M2
GLUCOSE QUALITATIVE U: NEGATIVE
GLUCOSE SERPL-MCNC: 99 MG/DL
KETONES URINE: NEGATIVE
LEUKOCYTE ESTERASE URINE: NEGATIVE
MICROSCOPIC-UA: NORMAL
NITRITE URINE: NEGATIVE
PH URINE: 6
POTASSIUM SERPL-SCNC: 4 MMOL/L
PROTEIN URINE: NORMAL
PSA FREE FLD-MCNC: 15 %
PSA FREE SERPL-MCNC: 0.69 NG/ML
PSA SERPL-MCNC: 4.66 NG/ML
RED BLOOD CELLS URINE: 0 /HPF
SODIUM SERPL-SCNC: 141 MMOL/L
SPECIFIC GRAVITY URINE: >=1.03
SQUAMOUS EPITHELIAL CELLS: 10 /HPF
TESTOST FREE SERPL-MCNC: 5.5 PG/ML
TESTOST SERPL-MCNC: 333 NG/DL
URINE CYTOLOGY: NORMAL
UROBILINOGEN URINE: NORMAL
WHITE BLOOD CELLS URINE: 0 /HPF

## 2022-04-06 ENCOUNTER — NON-APPOINTMENT (OUTPATIENT)
Age: 67
End: 2022-04-06

## 2023-08-10 ENCOUNTER — OFFICE (OUTPATIENT)
Dept: URBAN - METROPOLITAN AREA CLINIC 90 | Facility: CLINIC | Age: 68
Setting detail: OPHTHALMOLOGY
End: 2023-08-10
Payer: MEDICARE

## 2023-08-10 DIAGNOSIS — H02.402: ICD-10-CM

## 2023-08-10 DIAGNOSIS — H25.13: ICD-10-CM

## 2023-08-10 DIAGNOSIS — H10.45: ICD-10-CM

## 2023-08-10 PROBLEM — H52.7 REFRACTIVE ERROR: Status: ACTIVE | Noted: 2023-08-10

## 2023-08-10 PROCEDURE — 92004 COMPRE OPH EXAM NEW PT 1/>: CPT | Performed by: OPHTHALMOLOGY

## 2023-08-10 ASSESSMENT — REFRACTION_MANIFEST
OD_CYLINDER: -3.75
OD_AXIS: 090
OS_ADD: +2.50
OD_AXIS: 093
OS_CYLINDER: -3.75
OS_AXIS: 098
OS_SPHERE: -4.00
OD_VA1: 20/20
OD_ADD: +2.75
OS_AXIS: 100
OD_CYLINDER: -2.75
OS_CYLINDER: -3.00
OS_SPHERE: -4.50
OD_SPHERE: -4.75
OD_SPHERE: -3.25
OS_VA1: 20/25

## 2023-08-10 ASSESSMENT — REFRACTION_AUTOREFRACTION
OS_AXIS: 102
OS_SPHERE: -3.50
OD_AXIS: 087
OS_CYLINDER: -3.75
OD_SPHERE: -2.75
OD_CYLINDER: -3.75

## 2023-08-10 ASSESSMENT — LID POSITION - PTOSIS: OS_PTOSIS: T

## 2023-08-10 ASSESSMENT — KERATOMETRY
OS_K2POWER_DIOPTERS: 45.00
OS_K1POWER_DIOPTERS: 44.25
OD_AXISANGLE_DEGREES: 007
OS_AXISANGLE_DEGREES: 025
OD_K1POWER_DIOPTERS: 43.25
OD_K2POWER_DIOPTERS: 44.00

## 2023-08-10 ASSESSMENT — AXIALLENGTH_DERIVED
OS_AL: 25.6296
OS_AL: 25.6872
OD_AL: 25.4891
OS_AL: 25.4018
OD_AL: 26.1901
OD_AL: 25.7186

## 2023-08-10 ASSESSMENT — REFRACTION_CURRENTRX
OD_AXIS: 093
OS_SPHERE: -4.50
OS_AXIS: 098
OS_CYLINDER: -3.00
OD_VPRISM_DIRECTION: PROGS
OD_ADD: +2.25
OS_ADD: +2.25
OS_VPRISM_DIRECTION: PROGS
OS_OVR_VA: 20/
OD_CYLINDER: -2.75
OD_SPHERE: -4.75
OD_OVR_VA: 20/

## 2023-08-10 ASSESSMENT — SPHEQUIV_DERIVED
OS_SPHEQUIV: -5.375
OS_SPHEQUIV: -6
OD_SPHEQUIV: -4.625
OD_SPHEQUIV: -5.125
OS_SPHEQUIV: -5.875
OD_SPHEQUIV: -6.125

## 2023-08-10 ASSESSMENT — CONFRONTATIONAL VISUAL FIELD TEST (CVF)
OS_FINDINGS: FULL
OD_FINDINGS: FULL

## 2023-08-10 ASSESSMENT — VISUAL ACUITY
OS_BCVA: 20/25
OD_BCVA: 20/25

## 2023-08-10 ASSESSMENT — TONOMETRY
OS_IOP_MMHG: 14
OD_IOP_MMHG: 14